# Patient Record
Sex: FEMALE | Race: ASIAN | NOT HISPANIC OR LATINO | Employment: FULL TIME | URBAN - METROPOLITAN AREA
[De-identification: names, ages, dates, MRNs, and addresses within clinical notes are randomized per-mention and may not be internally consistent; named-entity substitution may affect disease eponyms.]

---

## 2017-10-31 ENCOUNTER — OFFICE VISIT - HEALTHEAST (OUTPATIENT)
Dept: INTERNAL MEDICINE | Facility: CLINIC | Age: 22
End: 2017-10-31

## 2017-10-31 ENCOUNTER — COMMUNICATION - HEALTHEAST (OUTPATIENT)
Dept: TELEHEALTH | Facility: CLINIC | Age: 22
End: 2017-10-31

## 2017-10-31 DIAGNOSIS — Z00.00 ANNUAL PHYSICAL EXAM: ICD-10-CM

## 2017-10-31 DIAGNOSIS — Z31.69 INFERTILITY COUNSELING: ICD-10-CM

## 2017-10-31 DIAGNOSIS — M54.50 LOW BACK PAIN: ICD-10-CM

## 2017-10-31 ASSESSMENT — MIFFLIN-ST. JEOR: SCORE: 1470.13

## 2017-11-01 ENCOUNTER — COMMUNICATION - HEALTHEAST (OUTPATIENT)
Dept: SCHEDULING | Facility: CLINIC | Age: 22
End: 2017-11-01

## 2017-11-01 ENCOUNTER — COMMUNICATION - HEALTHEAST (OUTPATIENT)
Dept: INTERNAL MEDICINE | Facility: CLINIC | Age: 22
End: 2017-11-01

## 2017-11-01 ENCOUNTER — OFFICE VISIT - HEALTHEAST (OUTPATIENT)
Dept: INTERNAL MEDICINE | Facility: CLINIC | Age: 22
End: 2017-11-01

## 2017-11-01 DIAGNOSIS — M54.2 CERVICAL PAIN (NECK): ICD-10-CM

## 2017-11-01 ASSESSMENT — MIFFLIN-ST. JEOR: SCORE: 1479.2

## 2017-11-08 ENCOUNTER — COMMUNICATION - HEALTHEAST (OUTPATIENT)
Dept: INTERNAL MEDICINE | Facility: CLINIC | Age: 22
End: 2017-11-08

## 2017-11-21 ENCOUNTER — AMBULATORY - HEALTHEAST (OUTPATIENT)
Dept: LAB | Facility: CLINIC | Age: 22
End: 2017-11-21

## 2017-11-21 DIAGNOSIS — Z31.69 INFERTILITY COUNSELING: ICD-10-CM

## 2017-11-22 ENCOUNTER — COMMUNICATION - HEALTHEAST (OUTPATIENT)
Dept: INTERNAL MEDICINE | Facility: CLINIC | Age: 22
End: 2017-11-22

## 2018-12-13 ENCOUNTER — TELEPHONE (OUTPATIENT)
Dept: OTHER | Facility: CLINIC | Age: 23
End: 2018-12-13

## 2018-12-13 NOTE — TELEPHONE ENCOUNTER
12/13/2018    Call Regarding Onboarding P1 Other    Attempt 1    Message on voicemail    Comments: 0 dep       Outreach   Jacquie Lacey

## 2018-12-21 NOTE — TELEPHONE ENCOUNTER
12/21/2018    Call Regarding Onboarding P1 Other    Attempt 2    Message on voicemail     Comments:       Outreach   CWR

## 2018-12-27 NOTE — TELEPHONE ENCOUNTER
12/27/2018    Call Regarding Onboarding P1 Other    Attempt 3    Message on voicemail     Comments:           Outreach   AT

## 2019-05-21 ENCOUNTER — COMMUNICATION - HEALTHEAST (OUTPATIENT)
Dept: SCHEDULING | Facility: CLINIC | Age: 24
End: 2019-05-21

## 2019-05-22 ENCOUNTER — OFFICE VISIT - HEALTHEAST (OUTPATIENT)
Dept: FAMILY MEDICINE | Facility: CLINIC | Age: 24
End: 2019-05-22

## 2019-05-22 DIAGNOSIS — R10.30 LOWER ABDOMINAL PAIN: ICD-10-CM

## 2019-05-22 LAB
ALBUMIN UR-MCNC: NEGATIVE MG/DL
APPEARANCE UR: CLEAR
BILIRUB UR QL STRIP: NEGATIVE
COLOR UR AUTO: YELLOW
GLUCOSE UR STRIP-MCNC: NEGATIVE MG/DL
HCG UR QL: NEGATIVE
HGB UR QL STRIP: NEGATIVE
KETONES UR STRIP-MCNC: NEGATIVE MG/DL
LEUKOCYTE ESTERASE UR QL STRIP: NEGATIVE
NITRATE UR QL: NEGATIVE
PH UR STRIP: 6 [PH] (ref 5–8)
SP GR UR STRIP: 1.02 (ref 1–1.03)
UROBILINOGEN UR STRIP-ACNC: NORMAL

## 2019-05-23 ENCOUNTER — COMMUNICATION - HEALTHEAST (OUTPATIENT)
Dept: FAMILY MEDICINE | Facility: CLINIC | Age: 24
End: 2019-05-23

## 2021-05-29 NOTE — PROGRESS NOTES
Assessment/Plan:        1. Lower abdominal pain  Exam findings were discussed   Plan   - Pregnancy (Beta-hCG, Qual), Urine- neg   - Urinalysis-UC if Indicated- normal   - XR Abdomen 2 Views; Future    We will follow-up to the results of the study and manage accordingly.      At the conclusion of the encounter the plan of care, disposition and all questions were answered and reviewed, and the patient acknowledged understanding and was involved in the decision making regarding the overall care plan.           Subjective:    Patient ID:   Marcela Campa is a 23 y.o. female presenting with sharp abdominal pain located in the center to lower part of the abdomen, on and off lasting for about 30 to 45 seconds with associated nausea with no vomiting for the past 3 days. There is no aggravation with eating or other factors to improving.  She states that the symptoms have occurred 3 times already, and about 6-7 times since last 3 days.         LMP 5/9, and said to be very light.       Review of Systems  Allergy: reviewed  General : negative  A complete 5 point review of systems was obtained and is negative other than what is stated in the HPI.      The following patient's history were reviewed and updated as appropriate:   She  has no past medical history on file..      Outpatient Encounter Medications as of 5/22/2019   Medication Sig Dispense Refill     [DISCONTINUED] tiZANidine (ZANAFLEX) 2 MG tablet Take 1 tablet (2 mg total) by mouth every 6 (six) hours as needed. 20 tablet 0     No facility-administered encounter medications on file as of 5/22/2019.          Objective:   /60 (Patient Site: Right Arm, Patient Position: Sitting, Cuff Size: Adult Regular)   Pulse 71   Wt 180 lb 1.6 oz (81.7 kg)   LMP 05/09/2019 (Approximate)   SpO2 98%   BMI 31.65 kg/m        Physical Exam  General Appearance:    Alert,  no acute distress, well hydrated    Eyes:    PERRL, conjunctiva/corneas clear, non icterus    Throat:   Lips,  mucosa, and tongue normal;  gums normal   Neck:   Supple, symmetrical, trachea midline, no adenopathy;           Lungs:     Clear to auscultation bilaterally, respirations unlabored   Heart:    Regular rate and rhythm, S1 and S2 normal, no murmur, rub   or gallop   Abdomen:      Soft, non distended , palpable tenderness to the lower half of the abdomen , normal bowel sounds, no rebound or guarding, no masses, no organomegaly   Extremities:   Extremities normal, atraumatic, no cyanosis or edema   Skin:   Skin color, texture, turgor normal, no rashes or lesions

## 2021-05-29 NOTE — TELEPHONE ENCOUNTER
Triage call:   Sharp pain in her stomach that comes and goes since Sunday- pains last 30-45 seconds. Intermittent nausea as well- no vomiting. Soft stools, Patient indicates that she doesn't think that she is pregnant LMP May 9. pain is in the center lower abdomen. Rating pain 5/10 when she has pain- last episode 10 minutes ago- no association with eating. Nothing makes better- yesterday took advil- didn't help. Stabbing sudden pain and then resolved. Has not had this type of pain before. Denies additional symptoms.      Triaged to be seen in the clinic within the next 24 hours- reviewed additional care advice and patient verbalizes understanding. Patient warm transferred to scheduling for appointment. Appointment tomorrow @ 11:15 with Dr Degroot.     La Evangelista RN BS[facility] Triage/Med Refill 5/21/2019 5:11 PM     Reason for Disposition    [1] MODERATE pain (e.g., interferes with normal activities) AND [2] pain comes and goes (cramps) AND [3] present > 24 hours  (Exception: pain with Vomiting or Diarrhea - see that Guideline)    Protocols used: ABDOMINAL PAIN - FEMALE-A-AH

## 2021-05-29 NOTE — TELEPHONE ENCOUNTER
Patient Returning Call  Reason for call:  Patient called back  Information relayed to patient:  Informed of Dr Degroot's message listed below.  Patient states understanding and agrees with plan.  Patient has additional questions:  No  If YES, what are your questions/concerns:    Okay to leave a detailed message?: No call back needed

## 2021-05-29 NOTE — TELEPHONE ENCOUNTER
----- Message from Vivek Degroot MD sent at 5/23/2019  2:25 PM CDT -----  pls call:   Normal / neg labs  Consider abdominal  X-ray for further evaluation, order has been placed will call to set up an appointment

## 2021-05-31 VITALS — WEIGHT: 170 LBS | HEIGHT: 63 IN | BODY MASS INDEX: 30.12 KG/M2

## 2021-05-31 VITALS — WEIGHT: 168 LBS | HEIGHT: 63 IN | BODY MASS INDEX: 29.77 KG/M2

## 2021-06-03 VITALS — BODY MASS INDEX: 31.65 KG/M2 | WEIGHT: 180.1 LBS

## 2021-06-13 NOTE — PROGRESS NOTES
Assessment/Plan:     1. Annual physical exam  2. Infertility counseling  3. Low back pain  - Explained that it can take 1 year of regular intercourse to get pregnant, she is not currently sexually active. She is encouraged to use ovulation kits to help time intercourse. Will do some basic lab evaluations including FSH day 3, she will call to schedule the lab appointment based on her next menstrual period. If normal lab results and she chooses to could see Dr. Martine Miller. I encouraged her to talk to her boyfriend about also having an evaluation.   - Reviewed the importance of monitoring for changes in breast appearance such as nipple inversion, changes in breast tissue texture, non-healing wounds, or nipple discharge   - The following high BMI interventions were performed this visit: encouragement to exercise   - Referral to PT/OT for low back pain evaluation and treatment        Subjective:     Marcela Campa is a 22 y.o. female who presents for an annual exam.     Has some midline low back pain. Worse with sitting. She does not follow an exercise routine. Has not tried any interventions to date.     She and her boyfriend have been trying to get pregnant for 2 years. She tried an ovulation kit and by the results was ovulated. For 2 months she tried the ovulation kit but then got frustrated with this and stopped. 6 months ago her and her boyfriend stopped having intercourse 6 months ago because they were not conceiving. Periods are sometimes irregular, typically monthly but sometimes she will have 3 months without a period.     The patient reports that there is not domestic violence in her life.     Healthy Habits:   Regular Exercise: No  Sunscreen Use: N/A, doesn't spend a lot of time outdoors/in sun. Sensitive to heat rash  Healthy Diet: Yes  Dental Visits Regularly: Yes  Sexually active: Yes    Immunization History   Administered Date(s) Administered     DTaP, historic 06/09/1998     HPV 9 Valent 10/31/2017      Hep A, historic 10/05/2007     Hep B, historic 08/09/2017     IPV 06/09/1998     Influenza, seasonal,quad inj 36+ mos 10/31/2017     MMR 06/09/1998     Td, historic 05/18/2005     Tdap 10/31/2017     Immunization status: tetanus, influenza, HPV    Gynecologic History  Patient's last menstrual period was 09/20/2017 (approximate).  Contraception: none  Last Pap: none. Results were: n/a    OB History   No data available       Current Outpatient Prescriptions   Medication Sig Dispense Refill     tiZANidine (ZANAFLEX) 2 MG tablet Take 1 tablet (2 mg total) by mouth every 6 (six) hours as needed. 20 tablet 0     No current facility-administered medications for this visit.      History reviewed. No pertinent past medical history.  History reviewed. No pertinent surgical history.  Review of patient's allergies indicates no known allergies.  Family History   Problem Relation Age of Onset     No Medical Problems Mother      No Medical Problems Father      No Medical Problems Sister      No Medical Problems Brother      No Medical Problems Sister      No Medical Problems Sister      Social History     Social History     Marital status: Single     Spouse name: N/A     Number of children: 0     Years of education: N/A     Occupational History     Schedule Coordinator at [employer]      Social History Main Topics     Smoking status: Never Smoker     Smokeless tobacco: Never Used     Alcohol use Yes      Comment: only special occasions     Drug use: No     Sexual activity: No     Other Topics Concern     Not on file     Social History Narrative       Review of Systems  General:  Negative except as noted above  Eyes: Negative except as noted above  Ears/Nose/Throat: Negative except as noted above- sore throat started today   Cardiovascular: Negative except as noted above  Respiratory:  Negative except as noted above  Gastrointestinal:  Negative except as noted above  Musculoskeletal:  Negative except as noted above. Low back  "pain   Skin: Negative except as noted above  Neurologic: Negative except as noted above  Psychiatric: Negative except as noted above  Endocrine: Negative except as noted above  Heme/Lymphatic: Negative except as noted above   Allergic/Immunologic: Negative except as noted above      Objective:      Vitals:    10/31/17 0657   BP: 112/62   Weight: 168 lb (76.2 kg)   Height: 5' 3.25\" (1.607 m)     Wt Readings from Last 3 Encounters:   11/01/17 170 lb (77.1 kg)   10/31/17 168 lb (76.2 kg)     Body mass index is 29.53 kg/(m^2).     Physical Exam:  General Appearance: Alert, cooperative, no distress.  Head: Normocephalic, without obvious abnormality, atraumatic  Eyes: PERRL, conjunctiva/corneas clear, EOM's intact  Ears: Normal TM's and external ear canals, both ears  Throat: Lips, mucosa, and tongue normal  Neck: Supple, symmetrical, trachea midline, no adenopathy;  thyroid: not enlarged, symmetric, no tenderness/mass/nodules  Back: Symmetric, no curvature, ROM normal, no CVA tenderness. Negative straight leg raises and negative JUNE   Lungs: Clear to auscultation bilaterally, respirations unlabored  Heart: Regular rate and rhythm, S1 and S2 normal, no murmur, rub, or gallop  Abdomen: Soft, non-tender, bowel sounds active all four quadrants,  no masses, no organomegaly  Pelvic: Genital: EXTERNAL GENITALIA: Normal appearing vulva without masses, tenderness or lesions. PERINEUM: normal and intact. URETHRAL MEATUS: normal VAGINA:  vagina with normal color and without discharge or lesions. CERVIX: normal appearing cervix without discharge or lesions. Non-friable. No CMT. UTERUS: uterus is normal size, shape, consistency, and non-tender. ADNEXA: no tenderness or fullness.   Extremities: Extremities normal, atraumatic, no cyanosis or edema  Skin: Skin color, texture, turgor normal, no rashes or lesions  Lymph nodes: Cervical, supraclavicular nodes normal  Neurologic: Normal  Psych: Normal affect.  Does not appear anxious or " depressed.

## 2021-06-20 ENCOUNTER — HEALTH MAINTENANCE LETTER (OUTPATIENT)
Age: 26
End: 2021-06-20

## 2021-06-25 NOTE — PROGRESS NOTES
Progress Notes by Mario Yap at 11/1/2017 11:40 AM     Author: Mario Yap Service: -- Author Type: Nurse Practitioner    Filed: 11/1/2017  1:04 PM Encounter Date: 11/1/2017 Status: Signed    : Mario Yap [facility]    Date of Service: 11/1/2017  Primary Provider: No Primary Care Provider    Patient Care Team:  No Primary Care Provider as PCP - General     ______________________________________________________________________     Patient's Pharmacy:  No Pharmacies Listed   Patient's Insurance:    Payor: [employer] / Plan: [employer] [place] / Product Type: PMAP /     ______________________________________________________________________    Assessment:    1. Cervical pain (neck)       ______________________________________________________________________      PHQ-2 Total Score: 0 (10/31/2017  6:59 AM)     Plan:  Patient Instructions   1. Recommend ibuprofen 600 mg by mouth three times daily x 5-7 days, take with food.  2. The patient is advised to apply ice or cold packs intermittently as needed to relieve pain.   3. Drink plenty of water with this.  4. Tizanadine 2 mg by mouth up to three times daily as needed for muscle tightness  5. Follow up with your Primary Care Provider for further evaluation if your symptoms persist or do not improve.      ______________________________________________________________________     Marcela Campa is 22 y.o. female who comes in today for:    Chief Complaint   Patient presents with   ? Neck Pain     Woke up with neck pain this morning. No injuries. Did receive the flu, Tdap, and HPV immunizations yesterday.      No past medical history on file.    No past surgical history on file.    There is no problem list on file for this patient.    Current Outpatient Prescriptions   Medication Sig   ? tiZANidine (ZANAFLEX) 2 MG tablet Take 1 tablet (2 mg total) by mouth every 6 (six) hours as needed.     Social  "History     Social History   ? Marital status: Single     Spouse name: N/A   ? Number of children: 0   ? Years of education: N/A     Occupational History   ? Schedule Coordinator at [employer]      Social History Main Topics   ? Smoking status: Never Smoker   ? Smokeless tobacco: Never Used   ? Alcohol use Yes      Comment: only special occasions   ? Drug use: No   ? Sexual activity: No     Other Topics Concern   ? Not on file     Social History Narrative     ______________________________________________________________________     History of present illness: Marcela Campa is a pleasant 22 y.o. female with no pertinent PMH who presents in clinic today for neck pain evaluation.  She states that she just woke up with neck pain located midline from C2-C8 and describes pain as sharp in nature rates it as 7/10, worse with movement, particularly with extension, but also rotation L/R and flexion.  If she stays still she does not feel the pain.  She also has hx of low back pain believed to be due to prolonged sitting.  She notes getting some numbness/tingling in her LE's at times with prolonged sitting about 1 hour or so.  With regard to her neck pain, she uses 2 pillows to sleep on at night.  She does not feel like this is like \"sleeping wrong\" and developing neck pain.  Lastly, she notes having had multiple immunizations yesterday around 7AM.  She had influenza, Tdap, and HPV (1st injection of series).  Denies localized upper arm localized reaction, fever, chills,     Review of systems:   On ROS, the patient denies any fever, chills, sweats, headaches, vision changes, chest pain or pressure.  No shortness of breath.   Positive for symptoms as noted in the HPI.    ______________________________________________________________________    Wt Readings from Last 3 Encounters:   11/01/17 170 lb (77.1 kg)   10/31/17 168 lb (76.2 kg)     BP Readings from Last 3 Encounters:   11/01/17 110/84   10/31/17 112/62     /84  " "Pulse 87  Ht 5' 3.25\" (1.607 m)  Wt 170 lb (77.1 kg)  BMI 29.88 kg/m2     Physical Exam:  General Appearance: Alert, cooperative, no distress, appears stated age  Head: Normocephalic, without obvious abnormality, atraumatic  Eyes: PERRL, conjunctiva/corneas clear, EOM's intact  Nose: Nares normal, septum midline,mucosa normal, no drainage  Throat: Lips, mucosa, and tongue normal; teeth and gums normal, no erythema, exudate, or thrush  Neck: Supple, symmetrical, trachea midline, no adenopathy;  thyroid: not enlarged, symmetric, no tenderness/mass/nodules; decreased ROM with rotation L/R/flexion, pain elicited with extension and resistance L & R.  Back: Symmetric, no curvature, ROM decreased with flexion, no CVA tenderness  Lungs: Clear to auscultation bilaterally, respirations unlabored  Heart: Regular rate and rhythm, S1 and S2 normal, no murmur, rub, or gallop  Abdomen: Soft, non-tender, bowel sounds active all four quadrants  Musculoskeletal: Normal range of motion. No joint swelling or deformity.   Extremities: Extremities normal, atraumatic, no cyanosis or edema  Skin: Skin color, texture, turgor normal, no rashes or lesions  Lymph nodes: Cervical & supraclavicular nodes normal  Neurologic: She is alert & oriented x 3. She has normal reflexes/gait.   Psychiatric: She has a normal mood and affect.       Mario Yap, [facility]  Internal Medicine  [facility]     Return if symptoms worsen or fail to improve.        "

## 2021-10-10 ENCOUNTER — HEALTH MAINTENANCE LETTER (OUTPATIENT)
Age: 26
End: 2021-10-10

## 2022-07-16 ENCOUNTER — HEALTH MAINTENANCE LETTER (OUTPATIENT)
Age: 27
End: 2022-07-16

## 2022-09-18 ENCOUNTER — HEALTH MAINTENANCE LETTER (OUTPATIENT)
Age: 27
End: 2022-09-18

## 2023-07-30 ENCOUNTER — HEALTH MAINTENANCE LETTER (OUTPATIENT)
Age: 28
End: 2023-07-30

## 2024-07-15 NOTE — PATIENT INSTRUCTIONS
If you have labs or imaging done, the results will automatically release in [facility] without an interpretation.  Your health care professional will review those results and send an interpretation with recommendations as soon as possible, but this may be 1-3 business days.    If you have any questions regarding your visit, please contact your care team.     [facility]: [phone]  [facility] CLINIC HOURS TELEPHONE NUMBER   Cassi Levine, JUAN C Camacho-RYLEE Mejia-RYLEE Tadeo-Surgery Scheduler  Alexandra-       Monday- [place]  8:00 am-4:00 pm    Tuesday- [place]  8:00 am-4:00 pm    Wednesday- [place] 8:00 am-4:00 pm    Thursday- [place] 8:00 am-4:00 pm    Friday- [place]  8:00 am-4:00 pm [facility]  [address] [place]  [place] [address]  PH: [phone]  Fax: [phone]    Imaging Scheduling all locations  PH: [phone]     [facility]  [address] [place] [address] [address].  [place], [place] [address] [phone]    [facility]  [address] [place] [address]  PH: [phone]     **Surgeries** Our Surgery Schedulers will contact you to schedule. If you do not receive a call within 3 business days, please call [phone].  Urgent Care locations:  [facility]       Monday-Friday   10 am - 8 pm    Saturday and Sunday   9 am - 5 pm   [phone]   If you need a medication refill, please contact your pharmacy. Please allow 3 business days for your refill to be completed.  As always, Thank you for trusting us with your healthcare needs!

## 2024-07-30 ENCOUNTER — OFFICE VISIT (OUTPATIENT)
Dept: OBGYN | Facility: CLINIC | Age: 29
End: 2024-07-30
Payer: COMMERCIAL

## 2024-07-30 VITALS — BODY MASS INDEX: 34.76 KG/M2 | WEIGHT: 197.8 LBS | DIASTOLIC BLOOD PRESSURE: 90 MMHG | SYSTOLIC BLOOD PRESSURE: 130 MMHG

## 2024-07-30 DIAGNOSIS — E03.8 SUBCLINICAL HYPOTHYROIDISM: ICD-10-CM

## 2024-07-30 DIAGNOSIS — Z31.41 FERTILITY TESTING: Primary | ICD-10-CM

## 2024-07-30 DIAGNOSIS — R79.89 ELEVATED PROLACTIN LEVEL: ICD-10-CM

## 2024-07-30 LAB
ESTRADIOL SERPL-MCNC: 56 PG/ML
FSH SERPL IRP2-ACNC: 6.1 MIU/ML
LH SERPL-ACNC: 12.4 MIU/ML
MIS SERPL-MCNC: 8.41 NG/ML (ref 0.58–8.1)
PROLACTIN SERPL 3RD IS-MCNC: 26 NG/ML (ref 5–23)
SHBG SERPL-SCNC: 45 NMOL/L (ref 30–135)
SHBG SERPL-SCNC: 46 NMOL/L (ref 30–135)
T4 FREE SERPL-MCNC: 1.19 NG/DL (ref 0.9–1.7)
TSH SERPL DL<=0.005 MIU/L-ACNC: 6.14 UIU/ML (ref 0.3–4.2)

## 2024-07-30 PROCEDURE — 84146 ASSAY OF PROLACTIN: CPT

## 2024-07-30 PROCEDURE — 84270 ASSAY OF SEX HORMONE GLOBUL: CPT

## 2024-07-30 PROCEDURE — 84443 ASSAY THYROID STIM HORMONE: CPT

## 2024-07-30 PROCEDURE — 99204 OFFICE O/P NEW MOD 45 MIN: CPT

## 2024-07-30 PROCEDURE — 83002 ASSAY OF GONADOTROPIN (LH): CPT

## 2024-07-30 PROCEDURE — 82627 DEHYDROEPIANDROSTERONE: CPT

## 2024-07-30 PROCEDURE — 83498 ASY HYDROXYPROGESTERONE 17-D: CPT

## 2024-07-30 PROCEDURE — G2211 COMPLEX E/M VISIT ADD ON: HCPCS

## 2024-07-30 PROCEDURE — 84403 ASSAY OF TOTAL TESTOSTERONE: CPT

## 2024-07-30 PROCEDURE — 83001 ASSAY OF GONADOTROPIN (FSH): CPT

## 2024-07-30 PROCEDURE — 36415 COLL VENOUS BLD VENIPUNCTURE: CPT

## 2024-07-30 PROCEDURE — 82166 ASSAY ANTI-MULLERIAN HORM: CPT

## 2024-07-30 PROCEDURE — 82670 ASSAY OF TOTAL ESTRADIOL: CPT

## 2024-07-30 PROCEDURE — 84439 ASSAY OF FREE THYROXINE: CPT

## 2024-07-30 RX ORDER — METFORMIN HCL 500 MG
2 TABLET, EXTENDED RELEASE 24 HR ORAL DAILY
COMMUNITY
Start: 2024-01-24

## 2024-07-30 NOTE — PROGRESS NOTES
Subjective:  Marcela is a 29 year old  who presents with difficulty conceiving since 2017.  Her menstrual periods are regular, occuring every 30-35 days, normal, and associated with premenstrual molimina.    Previous infertility work up included: ovulation predictor kits - positive  semen analysis - she reports her partner had abnormal sperm and was requested to have this repeated but never followed up  blood tests:  TSH, prolactin, testosterone, 17-OHP, DHEA all WNL in 2019. TVUS showed multiple follicles on bilateral ovaries    She reports she was supposed to get her period late last week but simply had 1 day of spotting. She has taken multiple UPTs and all negative.    Female factor:    General health: BMI 34  Prior pregnancies:  No  Previous infertility work up:  Yes  Most recent form of birth control:  N/A  History of STI:  No  Ovulation predictor kits: Yes  Timed intercourse: Yes  Tubal study done?:  No  Chemical or toxin exposure at home or work: No    Male factor:   General health: Good  Father of prior pregnancies:  No  Semen Analysis: Yes  Chemical or toxin exposure at home or work: No      ROS: Ten point review of systems was reviewed and negative except the above.    PMH: Her past medical, surgical, and obstetric histories were reviewed and are documented in their appropriate chart areas.    ALL/Meds: Her medication and allergy histories were reviewed and are documented in their appropriate chart areas.    SH/FamHx: are reviewed and documented in the appropriate chart areas.    Objective:  PE: BP (!) 130/90   Wt 89.7 kg (197 lb 12.8 oz)   LMP 2024   BMI 34.76 kg/m    General Appearance:  healthy, alert, active, no distress  Pelvic: deferred    A/P:  29 year old  with infertility  (Z31.41) Fertility testing  (primary encounter diagnosis)  Comment: Informed that due to her recent irregular cycle that we can proceed with lab work today but she may need to return for repeat Day 3  labs.  Plan: 17 OH progesterone, Mullerian Hormone Antibody,        Sex Hormone Binding Globulin, Testosterone Free        and Total, US Pelvic Transabdominal and         Transvaginal, DHEA sulfate, Prolactin,         Estradiol, Follicle stimulating hormone,         Luteinizing Hormone, TSH with free T4 reflex, Progesterone          Discussed male and female factors of infertility. Discussed the association of regular ovulation and regular menstruation and testing of post-ovulation progesterone levels. Discussed urine LH testing. Discussed possible testing including semen analysis, laboratory evaluation of ovulation, and evaluation of uterine/tubal anatomy. Discussed variety of treatment options depending on most likely etiology of infertility, such as (but not limited to) ovulation induction medication, behavioral changes, changes to sex practices and timing/frequency of intercourse, IVF, and treatment/improvement of conditions underlying etiology of infertility. Informed that 80% of couples will conceive within 6mo; 85% will conceive within 12 months. Discussed use of period tracking apps and discussed signs of ovulation and what to look for and track to know if patient is ovulating. Encouraged both partners to take CoQ10 and instructed female to take PNV w/ folic acid + DHA. Discussed that vigorous or strenuous exercise during luteal phase can decrease progesterone production. No further questions/concerns. Pt would like to proceed with lab work at this time.    35 minutes spent on the date of the encounter doing chart review, review of outside records, review of test results, interpretation of tests, patient visit, and documentation      GINA Worthy CNP

## 2024-07-31 ENCOUNTER — HOSPITAL ENCOUNTER (OUTPATIENT)
Dept: ULTRASOUND IMAGING | Facility: HOSPITAL | Age: 29
Discharge: HOME | End: 2024-07-31
Payer: COMMERCIAL

## 2024-07-31 DIAGNOSIS — Z31.41 FERTILITY TESTING: ICD-10-CM

## 2024-07-31 LAB — DHEA-S SERPL-MCNC: 185 UG/DL (ref 35–430)

## 2024-07-31 PROCEDURE — 76830 TRANSVAGINAL US NON-OB: CPT

## 2024-07-31 PROCEDURE — 76856 US EXAM PELVIC COMPLETE: CPT

## 2024-07-31 RX ORDER — LEVOTHYROXINE SODIUM 50 UG/1
50 TABLET ORAL DAILY
Qty: 60 TABLET | Refills: 0 | Status: SHIPPED | OUTPATIENT
Start: 2024-07-31 | End: 2024-09-04

## 2024-08-01 LAB
TESTOST FREE SERPL-MCNC: 0.7 NG/DL
TESTOST SERPL-MCNC: 47 NG/DL (ref 8–60)

## 2024-08-03 LAB — 17OHP SERPL-MCNC: 32 NG/DL

## 2024-08-31 ENCOUNTER — LAB (OUTPATIENT)
Dept: LAB | Facility: CLINIC | Age: 29
End: 2024-08-31
Payer: COMMERCIAL

## 2024-08-31 DIAGNOSIS — E03.8 SUBCLINICAL HYPOTHYROIDISM: ICD-10-CM

## 2024-08-31 DIAGNOSIS — R79.89 ELEVATED PROLACTIN LEVEL: ICD-10-CM

## 2024-08-31 LAB
PROLACTIN SERPL 3RD IS-MCNC: 45 NG/ML (ref 5–23)
T4 FREE SERPL-MCNC: 1.41 NG/DL (ref 0.9–1.7)
TSH SERPL DL<=0.005 MIU/L-ACNC: 6.5 UIU/ML (ref 0.3–4.2)

## 2024-08-31 PROCEDURE — 84443 ASSAY THYROID STIM HORMONE: CPT

## 2024-08-31 PROCEDURE — 84146 ASSAY OF PROLACTIN: CPT

## 2024-08-31 PROCEDURE — 84439 ASSAY OF FREE THYROXINE: CPT

## 2024-08-31 PROCEDURE — 36415 COLL VENOUS BLD VENIPUNCTURE: CPT

## 2024-09-04 RX ORDER — LEVOTHYROXINE SODIUM 75 UG/1
75 TABLET ORAL DAILY
Qty: 90 TABLET | Refills: 0 | Status: SHIPPED | OUTPATIENT
Start: 2024-09-04

## 2024-09-10 ENCOUNTER — MYC MEDICAL ADVICE (OUTPATIENT)
Dept: OBGYN | Facility: CLINIC | Age: 29
End: 2024-09-10
Payer: COMMERCIAL

## 2024-09-10 NOTE — TELEPHONE ENCOUNTER
Pt last seen 7/30/2024 for fertility testing, pt recently increased her levothyroxine to 75mcg given recent TSH on 8/31 of 6.50    Pt's cycle usually every 30-35 days. LMP 8/26-9/1. Pt started having light bleeding today, no additional symptoms and asking if this could be related to medication change.    RN routing to providers to advise.    Janice Hernandez RN on 9/10/2024 at 1:44 PM

## 2024-09-11 NOTE — TELEPHONE ENCOUNTER
This may be an ovulatory bleed, as this is 15 days from the start of bleeding, 08/26.   Harsha Courtney MD

## 2024-09-20 ENCOUNTER — LAB (OUTPATIENT)
Dept: LAB | Facility: CLINIC | Age: 29
End: 2024-09-20
Payer: COMMERCIAL

## 2024-09-20 DIAGNOSIS — E03.8 SUBCLINICAL HYPOTHYROIDISM: ICD-10-CM

## 2024-09-20 DIAGNOSIS — Z31.41 FERTILITY TESTING: ICD-10-CM

## 2024-09-20 DIAGNOSIS — R79.89 ELEVATED PROLACTIN LEVEL: ICD-10-CM

## 2024-09-20 LAB
PROGEST SERPL-MCNC: 0.3 NG/ML
PROLACTIN SERPL 3RD IS-MCNC: 44 NG/ML (ref 5–23)
T4 FREE SERPL-MCNC: 1.65 NG/DL (ref 0.9–1.7)
TSH SERPL DL<=0.005 MIU/L-ACNC: 4.43 UIU/ML (ref 0.3–4.2)

## 2024-09-20 PROCEDURE — 84144 ASSAY OF PROGESTERONE: CPT

## 2024-09-20 PROCEDURE — 84439 ASSAY OF FREE THYROXINE: CPT

## 2024-09-20 PROCEDURE — 84443 ASSAY THYROID STIM HORMONE: CPT

## 2024-09-20 PROCEDURE — 84146 ASSAY OF PROLACTIN: CPT

## 2024-09-20 PROCEDURE — 36415 COLL VENOUS BLD VENIPUNCTURE: CPT

## 2024-09-22 ENCOUNTER — HEALTH MAINTENANCE LETTER (OUTPATIENT)
Age: 29
End: 2024-09-22

## 2024-09-23 ENCOUNTER — MYC MEDICAL ADVICE (OUTPATIENT)
Dept: OBGYN | Facility: CLINIC | Age: 29
End: 2024-09-23
Payer: COMMERCIAL

## 2024-09-23 DIAGNOSIS — R79.89 ELEVATED PROLACTIN LEVEL: ICD-10-CM

## 2024-09-23 DIAGNOSIS — Z31.41 FERTILITY TESTING: Primary | ICD-10-CM

## 2024-09-23 DIAGNOSIS — E03.8 SUBCLINICAL HYPOTHYROIDISM: Primary | ICD-10-CM

## 2024-09-23 RX ORDER — LEVOTHYROXINE SODIUM 100 UG/1
100 TABLET ORAL DAILY
Qty: 90 TABLET | Refills: 0 | Status: SHIPPED | OUTPATIENT
Start: 2024-09-23

## 2024-09-23 NOTE — TELEPHONE ENCOUNTER
Pt last seen 7/30/2024 for fertility testing    Pt states LMP 8/26/2024 for 1 week with heavy bleeding, has had intermittent bleeding since with light bleeding starting today.    Pt denies cramping, vaginal itching, burning or abnormal discharge    Pt asking if the 75mg levothyroxine dose increase could be influencing her bleeding.    RN routing to provider to advise.    Janice Hernandez RN on 9/23/2024 at 11:40 AM     This writer attempted to contact Prema on 07/19/19      Reason for call Yanira refill/needs OV and left message.      If patient calls back:   Relay message to patient that she has been given yanira refill, help schedule an annual physical, (read verbatim), document that pt called and close encounter        Naomi Diallo MA

## 2024-09-23 NOTE — TELEPHONE ENCOUNTER
Regarding her progesterone, we need to recheck on day 21 - ordered.  Regarding bleeding, I would anticipate no abnormal bleeding when improving her TSH so it is hard to say if this is related. I do think it is important to have this at a normal level when TTC. She should reach out if her bleeding is persistent like this and also take a pregnancy test.    Thanks!  GINA Worthy CNP

## 2024-09-24 NOTE — TELEPHONE ENCOUNTER
"Cassi Levine, GINA CNP  [facility] Triage  Phone Number: [phone]   \"Since her last progesterone level was not done on day 21 I do not think it can count so I would like her to repeat this on a future Day 21 if possible. I think it may be appropriate to consider starting ovulation induction medications but I would like to get her TSH WNL before doing this\"  "

## 2024-10-12 ENCOUNTER — LAB (OUTPATIENT)
Dept: LAB | Facility: CLINIC | Age: 29
End: 2024-10-12
Payer: COMMERCIAL

## 2024-10-12 DIAGNOSIS — R79.89 ELEVATED PROLACTIN LEVEL: ICD-10-CM

## 2024-10-12 DIAGNOSIS — E03.8 SUBCLINICAL HYPOTHYROIDISM: ICD-10-CM

## 2024-10-12 DIAGNOSIS — Z31.41 FERTILITY TESTING: ICD-10-CM

## 2024-10-12 LAB
PROGEST SERPL-MCNC: 0.2 NG/ML
PROLACTIN SERPL 3RD IS-MCNC: 54 NG/ML (ref 5–23)
TSH SERPL DL<=0.005 MIU/L-ACNC: 2.94 UIU/ML (ref 0.3–4.2)

## 2024-10-12 PROCEDURE — 36415 COLL VENOUS BLD VENIPUNCTURE: CPT

## 2024-10-12 PROCEDURE — 84144 ASSAY OF PROGESTERONE: CPT

## 2024-10-12 PROCEDURE — 84443 ASSAY THYROID STIM HORMONE: CPT

## 2024-10-12 PROCEDURE — 84146 ASSAY OF PROLACTIN: CPT

## 2024-10-15 DIAGNOSIS — R79.89 ELEVATED PROLACTIN LEVEL: Primary | ICD-10-CM

## 2024-10-29 ENCOUNTER — MYC MEDICAL ADVICE (OUTPATIENT)
Dept: OBGYN | Facility: CLINIC | Age: 29
End: 2024-10-29
Payer: COMMERCIAL

## 2024-11-25 DIAGNOSIS — E03.8 SUBCLINICAL HYPOTHYROIDISM: ICD-10-CM

## 2025-02-23 RX ORDER — LEVOTHYROXINE SODIUM 75 UG/1
75 TABLET ORAL DAILY
Qty: 90 TABLET | Refills: 0 | OUTPATIENT
Start: 2025-02-23

## 2025-03-31 NOTE — CONFIDENTIAL NOTE
RECORDS RECEIVED FROM: internal / ce    DATE RECEIVED: 6.26.25    NOTES (FOR ALL VISITS) STATUS DETAILS   OFFICE NOTES from referring provider internal  Abner, GINA Davis CNP    OFFICE NOTES from other specialist internal  7.30.24 Abner      MEDICATION LIST internal     LABS     DIABETES: HBGA1C, CREATININE, FASTING LIPIDS, MICROALBUMIN URINE, POTASSIUM, TSH, T4    THYROID: TSH, T4, CBC, THYRODLONULIN, TOTAL T3, FREE T4, CALCITONIN, CEA internal / ce  TSH/T4- 3.13.25   Prolactin- 3.13.25   FSH- 7.30.24  Testosterone total- 7.30.24   CREATININE- 7.11.23

## 2025-05-15 ENCOUNTER — TELEPHONE (OUTPATIENT)
Dept: ENDOCRINOLOGY | Facility: CLINIC | Age: 30
End: 2025-05-15
Payer: COMMERCIAL

## 2025-05-15 NOTE — TELEPHONE ENCOUNTER
Patient confirmed she wanted to cancel the scheduled appointment:  Date: 6/26/25  Time: 11:00 am  Visit type: NEW PITUITARY  Provider: Mele Rueda MD  Location: [facility] DIABETES  Testing/imaging: N/A  Additional notes: Spoke to patient to reschedule appointment from 6/26 do to changes in the providers schedule.  She wanted me to cancel this appointment and she said she would call back at a later date to reschedule.    Dalton Hernandez on 5/15/2025 at 10:21 AM

## 2025-05-20 ENCOUNTER — LAB REQUISITION (OUTPATIENT)
Dept: LAB | Facility: CLINIC | Age: 30
End: 2025-05-20

## 2025-05-20 DIAGNOSIS — E28.2 POLYCYSTIC OVARIAN SYNDROME: ICD-10-CM

## 2025-05-20 PROCEDURE — 84144 ASSAY OF PROGESTERONE: CPT | Performed by: OBSTETRICS & GYNECOLOGY

## 2025-05-20 PROCEDURE — 84443 ASSAY THYROID STIM HORMONE: CPT | Performed by: OBSTETRICS & GYNECOLOGY

## 2025-05-21 LAB
PROGEST SERPL-MCNC: 0.1 NG/ML
TSH SERPL DL<=0.005 MIU/L-ACNC: 3.04 UIU/ML (ref 0.3–4.2)

## 2025-06-26 ENCOUNTER — PRE VISIT (OUTPATIENT)
Dept: ENDOCRINOLOGY | Facility: CLINIC | Age: 30
End: 2025-06-26

## 2025-07-28 ENCOUNTER — LAB REQUISITION (OUTPATIENT)
Dept: LAB | Facility: CLINIC | Age: 30
End: 2025-07-28

## 2025-07-28 DIAGNOSIS — E28.2 POLYCYSTIC OVARIAN SYNDROME: ICD-10-CM

## 2025-07-28 PROCEDURE — 84144 ASSAY OF PROGESTERONE: CPT | Performed by: OBSTETRICS & GYNECOLOGY

## 2025-07-29 LAB — PROGEST SERPL-MCNC: 1.7 NG/ML

## 2025-09-03 ENCOUNTER — LAB REQUISITION (OUTPATIENT)
Dept: LAB | Facility: CLINIC | Age: 30
End: 2025-09-03

## 2025-09-03 DIAGNOSIS — E28.2 POLYCYSTIC OVARIAN SYNDROME: ICD-10-CM

## 2025-09-03 LAB — PROGEST SERPL-MCNC: 23.4 NG/ML

## 2025-09-03 PROCEDURE — 84144 ASSAY OF PROGESTERONE: CPT | Performed by: OBSTETRICS & GYNECOLOGY
